# Patient Record
Sex: MALE | Race: ASIAN | NOT HISPANIC OR LATINO
[De-identification: names, ages, dates, MRNs, and addresses within clinical notes are randomized per-mention and may not be internally consistent; named-entity substitution may affect disease eponyms.]

---

## 2024-05-23 ENCOUNTER — APPOINTMENT (OUTPATIENT)
Dept: CARDIOLOGY | Facility: CLINIC | Age: 34
End: 2024-05-23
Payer: COMMERCIAL

## 2024-05-23 VITALS
SYSTOLIC BLOOD PRESSURE: 118 MMHG | WEIGHT: 162 LBS | BODY MASS INDEX: 24.55 KG/M2 | DIASTOLIC BLOOD PRESSURE: 75 MMHG | HEART RATE: 61 BPM | OXYGEN SATURATION: 97 % | HEIGHT: 68 IN | RESPIRATION RATE: 18 BRPM

## 2024-05-23 DIAGNOSIS — Z86.39 PERSONAL HISTORY OF OTHER ENDOCRINE, NUTRITIONAL AND METABOLIC DISEASE: ICD-10-CM

## 2024-05-23 DIAGNOSIS — Z87.891 PERSONAL HISTORY OF NICOTINE DEPENDENCE: ICD-10-CM

## 2024-05-23 DIAGNOSIS — R94.31 ABNORMAL ELECTROCARDIOGRAM [ECG] [EKG]: ICD-10-CM

## 2024-05-23 DIAGNOSIS — R07.9 CHEST PAIN, UNSPECIFIED: ICD-10-CM

## 2024-05-23 PROBLEM — Z00.00 ENCOUNTER FOR PREVENTIVE HEALTH EXAMINATION: Status: ACTIVE | Noted: 2024-05-23

## 2024-05-23 PROCEDURE — 99204 OFFICE O/P NEW MOD 45 MIN: CPT | Mod: 25

## 2024-05-23 PROCEDURE — 93306 TTE W/DOPPLER COMPLETE: CPT

## 2024-05-23 PROCEDURE — 93015 CV STRESS TEST SUPVJ I&R: CPT

## 2024-05-23 NOTE — ASSESSMENT
[FreeTextEntry1] : 33 year old M with mild HLD, prior smoker who presents for cardiac evaluation for abnormal EKG  Pt reports recently, he has been having intermittent chest discomfort that occurs randomly. It is not related to exertion. He cannot say how long it lasts for. He also reports occasional tingling in his hands at night. He reports mild SOB when he exercises intensely. No palpitations, dizziness, LOC, orthopnea, PND, or LE edema. He went to see Dr. Price who did EKG showing possible Q waves in inferior leads so he was told to come to cardiology for evaluation.  1) CP, atypical in nature, not related to exertion 2) Abnormal EKG - EKG at Dr. Price's showed sinus rhythm with possible inferior q waves - I advised pt to undergo treadmill stress to r/o ischemia; pt did 10:30 min [place] protocol, and had no significant ischemic ECG changes - Pt underwent TTE 5/23/24 which showed normal LV and RV function without significant valvular disease. Incidentally found mildly dilated aortic root (4.0 cm) but normal ascending aorta. - Pt was reassured regarding his EKG (likely normal variant). In regard to his dilated aortic root, he denies FHx of cardiac/aortic disease. I advised pt to continue to NOT smoke cigarettes. F/U annually to monitor aortic size.  3) Follow-up, annually

## 2024-05-23 NOTE — HISTORY OF PRESENT ILLNESS
[FreeTextEntry1] : 33 year old M with mild HLD, prior smoker who presents for cardiac evaluation for abnormal EKG  Pt reports recently, he has been having intermittent chest discomfort that occurs randomly. It is not related to exertion. He cannot say how long it lasts for. He also reports occasional tingling in his hands at night. He reports mild SOB when he exercises intensely. No palpitations, dizziness, LOC, orthopnea, PND, or LE edema. He went to see Dr. Price who did EKG showing possible Q waves in inferior leads so he was told to come to cardiology for evaluation.